# Patient Record
Sex: FEMALE | Race: WHITE | NOT HISPANIC OR LATINO | Employment: OTHER | URBAN - METROPOLITAN AREA
[De-identification: names, ages, dates, MRNs, and addresses within clinical notes are randomized per-mention and may not be internally consistent; named-entity substitution may affect disease eponyms.]

---

## 2017-11-30 ENCOUNTER — ESTABLISHED COMPREHENSIVE EXAM (OUTPATIENT)
Dept: URBAN - METROPOLITAN AREA CLINIC 36 | Facility: CLINIC | Age: 67
End: 2017-11-30

## 2017-11-30 DIAGNOSIS — E11.3493: ICD-10-CM

## 2017-11-30 DIAGNOSIS — H35.033: ICD-10-CM

## 2017-11-30 DIAGNOSIS — H43.813: ICD-10-CM

## 2017-11-30 PROCEDURE — 2022F DILAT RTA XM EVC RTNOPTHY: CPT

## 2017-11-30 PROCEDURE — 1036F TOBACCO NON-USER: CPT

## 2017-11-30 PROCEDURE — 92134 CPTRZ OPH DX IMG PST SGM RTA: CPT

## 2017-11-30 PROCEDURE — G8397 DIL MACULA/FUNDUS EXAM/W DOC: HCPCS

## 2017-11-30 PROCEDURE — G8756 NO BP MEASURE DOC: HCPCS

## 2017-11-30 PROCEDURE — 92014 COMPRE OPH EXAM EST PT 1/>: CPT

## 2017-11-30 PROCEDURE — 9222550 BILAT EXTENDED OPHTHALMOSCOPY, FIRST

## 2017-11-30 PROCEDURE — G8428 CUR MEDS NOT DOCUMENT: HCPCS

## 2017-11-30 PROCEDURE — 5010F MACUL RESULT PHY/QHP MNG DM: CPT

## 2017-11-30 ASSESSMENT — TONOMETRY
OS_IOP_MMHG: 11
OD_IOP_MMHG: 11

## 2017-11-30 ASSESSMENT — VISUAL ACUITY
OD_CC: 20/70-1
OS_CC: 20/30+1

## 2018-02-07 ENCOUNTER — ESTABLISHED COMPREHENSIVE EXAM (OUTPATIENT)
Dept: URBAN - METROPOLITAN AREA CLINIC 36 | Facility: CLINIC | Age: 68
End: 2018-02-07

## 2018-02-07 DIAGNOSIS — H35.033: ICD-10-CM

## 2018-02-07 DIAGNOSIS — H26.493: ICD-10-CM

## 2018-02-07 DIAGNOSIS — H43.813: ICD-10-CM

## 2018-02-07 DIAGNOSIS — E11.3493: ICD-10-CM

## 2018-02-07 PROCEDURE — G8428 CUR MEDS NOT DOCUMENT: HCPCS

## 2018-02-07 PROCEDURE — 2022F DILAT RTA XM EVC RTNOPTHY: CPT

## 2018-02-07 PROCEDURE — 92015 DETERMINE REFRACTIVE STATE: CPT

## 2018-02-07 PROCEDURE — G8397 DIL MACULA/FUNDUS EXAM/W DOC: HCPCS

## 2018-02-07 PROCEDURE — 92014 COMPRE OPH EXAM EST PT 1/>: CPT

## 2018-02-07 PROCEDURE — 5010F MACUL RESULT PHY/QHP MNG DM: CPT

## 2018-02-07 PROCEDURE — 1036F TOBACCO NON-USER: CPT

## 2018-02-07 PROCEDURE — 2021F DILAT MACULAR EXAM DONE: CPT

## 2018-02-07 ASSESSMENT — VISUAL ACUITY
OD_CC: 20/70-1
OD_CC: J5
OS_SC: J12
OS_SC: 20/80
OS_CC: 20/30+2
OD_SC: J12
OD_SC: 20/80+2
OS_CC: J12

## 2018-02-07 ASSESSMENT — TONOMETRY
OD_IOP_MMHG: 16
OS_IOP_MMHG: 16

## 2018-08-09 NOTE — PATIENT DISCUSSION
GLAUCOMA SUSPECT, OU : INTRAOCULAR PRESSURE IS HIGH. OCT  IS WITHIN ACCEPTABLE LIMITS. NO DROP THERAPY NEEDED AT THIS TIME.

## 2018-10-30 NOTE — PATIENT DISCUSSION
New Prescription: olopatadine (olopatadine): drops: 0.2% 1 drop once a day as directed into both eyes 10-

## 2018-11-06 ENCOUNTER — ESTABLISHED COMPREHENSIVE EXAM (OUTPATIENT)
Dept: URBAN - METROPOLITAN AREA CLINIC 36 | Facility: CLINIC | Age: 68
End: 2018-11-06

## 2018-11-06 DIAGNOSIS — H31.002: ICD-10-CM

## 2018-11-06 DIAGNOSIS — E11.3492: ICD-10-CM

## 2018-11-06 DIAGNOSIS — H35.033: ICD-10-CM

## 2018-11-06 DIAGNOSIS — E11.3411: ICD-10-CM

## 2018-11-06 DIAGNOSIS — H43.813: ICD-10-CM

## 2018-11-06 PROCEDURE — G8397 DIL MACULA/FUNDUS EXAM/W DOC: HCPCS

## 2018-11-06 PROCEDURE — G9903 PT SCRN TBCO ID AS NON USER: HCPCS

## 2018-11-06 PROCEDURE — J2778DME LUCENTIS 0.3MG

## 2018-11-06 PROCEDURE — G8756 NO BP MEASURE DOC: HCPCS

## 2018-11-06 PROCEDURE — 5010F MACUL RESULT PHY/QHP MNG DM: CPT

## 2018-11-06 PROCEDURE — 2022F DILAT RTA XM EVC RTNOPTHY: CPT

## 2018-11-06 PROCEDURE — 9222650 BILAT EXTENDED OPHTHALMOSCOPY, F/U

## 2018-11-06 PROCEDURE — 1036F TOBACCO NON-USER: CPT

## 2018-11-06 PROCEDURE — G8428 CUR MEDS NOT DOCUMENT: HCPCS

## 2018-11-06 PROCEDURE — 92287 ANT SGM IMG IR FLRSCN ANGRPH: CPT

## 2018-11-06 PROCEDURE — 92014 COMPRE OPH EXAM EST PT 1/>: CPT

## 2018-11-06 PROCEDURE — 67028 INJECTION EYE DRUG: CPT

## 2018-11-06 PROCEDURE — 92235 FLUORESCEIN ANGRPH MLTIFRAME: CPT

## 2018-11-06 PROCEDURE — 92250 FUNDUS PHOTOGRAPHY W/I&R: CPT

## 2018-11-06 ASSESSMENT — VISUAL ACUITY
OS_CC: 20/30+2
OD_CC: 20/40

## 2018-11-06 ASSESSMENT — TONOMETRY
OS_IOP_MMHG: 14
OD_IOP_MMHG: 13

## 2018-12-04 ENCOUNTER — ESTABLISHED PATIENT (OUTPATIENT)
Dept: URBAN - METROPOLITAN AREA CLINIC 36 | Facility: CLINIC | Age: 68
End: 2018-12-04

## 2018-12-04 DIAGNOSIS — E11.3411: ICD-10-CM

## 2018-12-04 DIAGNOSIS — H31.002: ICD-10-CM

## 2018-12-04 DIAGNOSIS — H43.813: ICD-10-CM

## 2018-12-04 DIAGNOSIS — H35.033: ICD-10-CM

## 2018-12-04 DIAGNOSIS — E11.3492: ICD-10-CM

## 2018-12-04 PROCEDURE — G8756 NO BP MEASURE DOC: HCPCS

## 2018-12-04 PROCEDURE — G8397 DIL MACULA/FUNDUS EXAM/W DOC: HCPCS

## 2018-12-04 PROCEDURE — 9222650 BILAT EXTENDED OPHTHALMOSCOPY, F/U

## 2018-12-04 PROCEDURE — G8428 CUR MEDS NOT DOCUMENT: HCPCS

## 2018-12-04 PROCEDURE — 92134 CPTRZ OPH DX IMG PST SGM RTA: CPT

## 2018-12-04 PROCEDURE — 1036F TOBACCO NON-USER: CPT

## 2018-12-04 PROCEDURE — G9903 PT SCRN TBCO ID AS NON USER: HCPCS

## 2018-12-04 PROCEDURE — 92012 INTRM OPH EXAM EST PATIENT: CPT

## 2018-12-04 PROCEDURE — 2022F DILAT RTA XM EVC RTNOPTHY: CPT

## 2018-12-04 PROCEDURE — 5010F MACUL RESULT PHY/QHP MNG DM: CPT

## 2018-12-04 PROCEDURE — 2021F DILAT MACULAR EXAM DONE: CPT

## 2018-12-04 ASSESSMENT — VISUAL ACUITY
OS_CC: 20/25-1
OD_CC: 20/70-1
OD_PH: 20/60-1

## 2018-12-04 ASSESSMENT — TONOMETRY
OD_IOP_MMHG: 15
OS_IOP_MMHG: 16

## 2019-02-14 NOTE — PATIENT DISCUSSION
GLAUCOMA SUSPECT, OU : INTRAOCULAR PRESSURE IS WITHIN ACCEPTABLE LIMITS. PACHS AVG. NO DROP THERAPY NEEDED AT THIS TIME.

## 2019-07-30 ENCOUNTER — ESTABLISHED PATIENT (OUTPATIENT)
Dept: URBAN - METROPOLITAN AREA CLINIC 36 | Facility: CLINIC | Age: 69
End: 2019-07-30

## 2019-07-30 DIAGNOSIS — E11.3492: ICD-10-CM

## 2019-07-30 DIAGNOSIS — E11.3411: ICD-10-CM

## 2019-07-30 DIAGNOSIS — H43.813: ICD-10-CM

## 2019-07-30 DIAGNOSIS — H35.033: ICD-10-CM

## 2019-07-30 DIAGNOSIS — E11.39: ICD-10-CM

## 2019-07-30 PROCEDURE — 92014 COMPRE OPH EXAM EST PT 1/>: CPT

## 2019-07-30 PROCEDURE — 92287 ANT SGM IMG IR FLRSCN ANGRPH: CPT

## 2019-07-30 PROCEDURE — 92235 FLUORESCEIN ANGRPH MLTIFRAME: CPT

## 2019-07-30 PROCEDURE — 92250 FUNDUS PHOTOGRAPHY W/I&R: CPT

## 2019-07-30 PROCEDURE — 9222650 BILAT EXTENDED OPHTHALMOSCOPY, F/U

## 2019-07-30 ASSESSMENT — TONOMETRY
OD_IOP_MMHG: 11
OS_IOP_MMHG: 12

## 2019-07-30 ASSESSMENT — VISUAL ACUITY
OD_CC: 20/30-2
OS_CC: 20/70-1

## 2019-09-20 NOTE — PATIENT DISCUSSION
PHOTOGRAPHS: I have reviewed the external ocular photographs of this patient which show the following: moderate dermatochalasis of both upper eyelids.

## 2019-09-20 NOTE — PATIENT DISCUSSION
Patient does not present w/ any significant drooping of either upper eyelid. Explained that any surgical intervention would be considered cosmetic at this time due to the fact that her vision is not obstructed. Patient expressed understanding.

## 2019-10-30 NOTE — PATIENT DISCUSSION
GLAUCOMA SUSPECT, OU : INTRAOCULAR PRESSURE IS WITHIN ACCEPTABLE LIMITS. POSSIBLE RNFL CHANGES OS SCHEDULE 24-2/PHOTO TO EVAL FURTHER. NO DROP THERAPY NEEDED AT THIS TIME.

## 2022-08-26 ENCOUNTER — NEW PATIENT (OUTPATIENT)
Dept: URBAN - METROPOLITAN AREA CLINIC 43 | Facility: CLINIC | Age: 72
End: 2022-08-26

## 2022-08-26 DIAGNOSIS — H35.033: ICD-10-CM

## 2022-08-26 DIAGNOSIS — E11.3492: ICD-10-CM

## 2022-08-26 DIAGNOSIS — Z96.1: ICD-10-CM

## 2022-08-26 DIAGNOSIS — E11.3411: ICD-10-CM

## 2022-08-26 PROCEDURE — 92015 DETERMINE REFRACTIVE STATE: CPT

## 2022-08-26 PROCEDURE — 92004 COMPRE OPH EXAM NEW PT 1/>: CPT

## 2022-08-26 PROCEDURE — 92250 FUNDUS PHOTOGRAPHY W/I&R: CPT

## 2022-08-26 ASSESSMENT — VISUAL ACUITY
OS_SC: J12
OD_CC: 20/50-2
OS_CC: 20/25
OS_CC: J6
OD_SC: 20/60
OD_CC: J6
OD_SC: J10-
OS_SC: 20/40-1

## 2022-08-26 ASSESSMENT — TONOMETRY
OD_IOP_MMHG: 13
OS_IOP_MMHG: 14

## 2023-12-27 ENCOUNTER — COMPREHENSIVE EXAM (OUTPATIENT)
Dept: URBAN - METROPOLITAN AREA CLINIC 43 | Facility: CLINIC | Age: 73
End: 2023-12-27

## 2023-12-27 DIAGNOSIS — H26.493: ICD-10-CM

## 2023-12-27 DIAGNOSIS — H31.002: ICD-10-CM

## 2023-12-27 DIAGNOSIS — H04.123: ICD-10-CM

## 2023-12-27 DIAGNOSIS — E11.3493: ICD-10-CM

## 2023-12-27 DIAGNOSIS — Z96.1: ICD-10-CM

## 2023-12-27 PROCEDURE — 92015 DETERMINE REFRACTIVE STATE: CPT

## 2023-12-27 PROCEDURE — 92014 COMPRE OPH EXAM EST PT 1/>: CPT

## 2023-12-27 ASSESSMENT — VISUAL ACUITY
OU_SC: 20/40-2
OU_CC: 20/30+2
OS_SC: 20/50
OS_CC: 20/30+1
OS_CC: J3
OU_CC: J2
OD_SC: 20/60

## 2023-12-27 ASSESSMENT — TONOMETRY
OS_IOP_MMHG: 13
OD_IOP_MMHG: 13